# Patient Record
Sex: MALE | Race: WHITE | NOT HISPANIC OR LATINO | Employment: FULL TIME | URBAN - METROPOLITAN AREA
[De-identification: names, ages, dates, MRNs, and addresses within clinical notes are randomized per-mention and may not be internally consistent; named-entity substitution may affect disease eponyms.]

---

## 2019-03-29 ENCOUNTER — VIRTUAL VISIT (OUTPATIENT)
Dept: FAMILY MEDICINE | Facility: OTHER | Age: 59
End: 2019-03-29

## 2019-03-30 NOTE — PROGRESS NOTES
"Date:   Clinician: Morales Rios  Clinician NPI: [id number]  Patient: Paul Valverde  Patient : 1960  Patient Address: [facility] [place] [address]  Patient Phone: [phone]  Visit Protocol: URI  Patient Summary:  Paul is a 58 year old ( : 1960 ) male who initiated a Visit for cold, sinus infection, or influenza. When asked the question \"Please sign me up to receive news, health information and promotions from [employer].\", Paul responded \"No\".    Paul states his symptoms started gradually 10-13 days ago. After his symptoms started, they improved and then got worse again.   His symptoms consist of malaise, nasal congestion, myalgia, and a sore throat.   Symptom details     Nasal secretions: The color of his mucus is green.    Sore throat: Paul reports having moderate throat pain (4-6 on a 10 point pain scale), does not have exudate on his tonsils, and can swallow liquids. The lymph nodes in his neck are not enlarged. A rash has not appeared on the skin since the sore throat started.      Paul denies having fever, facial pain or pressure, cough, chills, rhinitis, wheezing, teeth pain, enlarged lymph nodes, headache, and ear pain. He also denies taking antibiotic medication for the symptoms and having recent facial or sinus surgery in the past 60 days. He is not experiencing dyspnea.   Within the past week, Paul has not been exposed to someone with strep throat. He has not recently been exposed to someone with influenza. Paul has not been in close contact with any high risk individuals.   Paul had 2 sinus infections within the past year.   Weight: 215 lbs   Paul does not smoke or use smokeless tobacco.   MEDICATIONS: simvastatin oral, ALLERGIES: NKDA  Clinician Response:  Dear Paul,  Based on the information provided, you have a viral upper respiratory infection, otherwise known as a cold. Symptoms vary from person to person, but can include sneezing, coughing, a runny " nose, sore throat, and headache and range from mild to severe.  Unfortunately, there are no medications that can cure a cold, so treatment is focused on controlling symptoms as much as possible. Most people gradually feel better until symptoms are gone in 1-2 weeks.  Medication information  Because you have a viral infection, antibiotics will not help you get better. Treating a viral infection with antibiotics could actually make you feel worse.  For more information on why I am not prescribing antibiotics, please watch this video: Antibiotics Aren't Always the Answer.  Unless you are allergic to the over-the-counter medication(s) below, I recommend using:       Acetaminophen (Tylenol or store brand) oral tablet. Take 1-2 tablets by mouth every 4-6 hours to help with the discomfort.    A decongestant such as Sudafed PE or store brand.      Guaifenesin + dextromethorphan (Robitussin DM, Mucinex DM, or store brand).      Saline nasal spray ([place] or store brand). Use 1-2 sprays in each nostril 3 times a day as needed for congestion.     Over-the-counter medications do not require a prescription. Ask the pharmacist if you have any questions.  Self care  The following tips will keep you as comfortable as possible while you recover:     Rest    Drink plenty of water and other liquids    Take a hot shower to loosen congestion    Use throat lozenges    Gargle with warm salt water (1/4 teaspoon of salt per 8 ounce glass of water)    Suck on frozen items such as popsicles or ice cubes    Drink hot tea with lemon and honey     When to seek care  Please be seen in a clinic or urgent care if new symptoms develop, or symptoms become worse.  Call 911 or go to the emergency room if you feel that your throat is closing off, you suddenly develop a rash, you are unable to swallow fluids, you are drooling, or you are having difficulty breathing.   Diagnosis: Viral URI  Diagnosis ICD: J06.9  Additional Clinician Notes: appears you have  another common cold virus. Therefore antibiotics are not helpful. There are no additional prescription medicine I would recommend at this time. Likely resolve gradually over the next 5-7 days. If worsening please seek care in a clinic or urgent care setting for further recommendations.

## 2023-02-08 ENCOUNTER — OFFICE VISIT (OUTPATIENT)
Dept: FAMILY MEDICINE | Facility: CLINIC | Age: 63
End: 2023-02-08

## 2023-02-08 VITALS
TEMPERATURE: 98 F | HEIGHT: 69 IN | DIASTOLIC BLOOD PRESSURE: 96 MMHG | HEART RATE: 67 BPM | BODY MASS INDEX: 32.14 KG/M2 | RESPIRATION RATE: 18 BRPM | SYSTOLIC BLOOD PRESSURE: 148 MMHG | OXYGEN SATURATION: 97 % | WEIGHT: 217 LBS

## 2023-02-08 DIAGNOSIS — Z71.89 ACP (ADVANCE CARE PLANNING): ICD-10-CM

## 2023-02-08 DIAGNOSIS — I48.20 CHRONIC ATRIAL FIBRILLATION (H): ICD-10-CM

## 2023-02-08 DIAGNOSIS — J32.9 RECURRENT SINUS INFECTIONS: Primary | ICD-10-CM

## 2023-02-08 DIAGNOSIS — E78.2 MIXED HYPERLIPIDEMIA: ICD-10-CM

## 2023-02-08 DIAGNOSIS — Z76.89 HEALTH CARE HOME: ICD-10-CM

## 2023-02-08 PROCEDURE — 99202 OFFICE O/P NEW SF 15 MIN: CPT | Performed by: FAMILY MEDICINE

## 2023-02-08 RX ORDER — AZITHROMYCIN 250 MG/1
TABLET, FILM COATED ORAL
COMMUNITY
Start: 2023-02-02 | End: 2023-02-08

## 2023-02-08 RX ORDER — ATORVASTATIN CALCIUM 20 MG/1
20 TABLET, FILM COATED ORAL DAILY
COMMUNITY
Start: 2022-06-24 | End: 2023-03-29

## 2023-02-08 RX ORDER — FLUTICASONE PROPIONATE 50 MCG
1 SPRAY, SUSPENSION (ML) NASAL DAILY
COMMUNITY

## 2023-02-08 RX ORDER — LORATADINE AND PSEUDOEPHEDRINE SULFATE 5; 120 MG/1; MG/1
1 TABLET, EXTENDED RELEASE ORAL
COMMUNITY
Start: 2023-02-02 | End: 2023-02-08

## 2023-02-08 RX ORDER — METHYLPREDNISOLONE 4 MG
TABLET, DOSE PACK ORAL
COMMUNITY
Start: 2023-02-02 | End: 2023-02-08

## 2023-02-08 NOTE — PROGRESS NOTES
"SUBJECTIVE:  Paul Valverde, a 62 year old male scheduled an appointment to discuss the following issues:     Recurrent sinus infections  Mixed hyperlipidemia  ACP (advance care planning)  Health Care Home  Chronic atrial fibrillation (H)  Pt here to discuss chronic sinus issues.He gets 2-3 infections yearly    He was seen in  2/2/23 and started on Zpack for sinusitis-is feeling better but still runny nose-pt coughs more when laying down at night    Pt has seen ENT in past- told to use flonase throughout winter-states he had CT    Pt does occasional sinus rinses    He has never seen allergy  Medical, social, surgical, and family histories reviewed.    ROS:  CONSTITUTIONAL: NEGATIVE for fever, chills  EYES: NEGATIVE for vision changes   RESP: NEGATIVE for significant cough or SOB  CV: NEGATIVE for chest pain, palpitations   GI: NEGATIVE for nausea, abdominal pain, heartburn, or change in bowel habits  : NEGATIVE for frequency, dysuria, or hematuria  MUSCULOSKELETAL: NEGATIVE for significant arthralgias or myalgia  NEURO: NEGATIVE for weakness, dizziness or paresthesias or headache    OBJECTIVE:  BP (!) 148/96 (BP Location: Right arm, Patient Position: Sitting, Cuff Size: Adult Large)   Pulse 67   Temp 98  F (36.7  C) (Temporal)   Resp 18   Ht 1.753 m (5' 9\")   Wt 98.4 kg (217 lb)   SpO2 97%   BMI 32.05 kg/m    EXAM:  GENERAL APPEARANCE: healthy, alert and no distress  EYES: EOMI,  PERRL  HENT: ear canals and TM's normal and nose and mouth without ulcers or lesions  NECK: no adenopathy, no asymmetry, masses, or scars and thyroid normal to palpation  RESP: lungs clear to auscultation - no rales, rhonchi or wheezes    ASSESSMENT/PLAN:  (J32.9) Recurrent sinus infections  (primary encounter diagnosis)  Comment: pt has seen ENT, tried flonase and sinus rinses-did have CT but no scope at ENT, discussed allergy referral  Plan: refer to allergy for ongoing issues    (E78.2) Mixed hyperlipidemia  Comment: control " uncertain  Plan: continue current medications at current doses     (Z71.89) ACP (advance care planning)  Comment:   Plan:     (Z76.89) Health Care Home  Comment:   Plan:     (I48.20) Chronic atrial fibrillation (H)  Comment: sees cardiology, not on anticoagulant due to low CHADvasc score  Plan:

## 2023-02-08 NOTE — NURSING NOTE
Chief Complaint   Patient presents with     New Patient     New patient to this clinic      Consult For     Has been having sinus pressure and congestion, gets sinus infections all of the time, wants to review what he can do about this going forward to try and help with it since it is a chronic problem, he went to urgent care and did start a zpac last Thursday and is feeling alright but still having sinus problems      Pre-visit Screening:  Immunizations:  not up to date - due for tetanus-declined today   Colonoscopy:  is up to date-need to get records  Mammogram: MILTON  Asthma Action Test/Plan:  MILTON  PHQ9:  PHQ2 done today   GAD7:  NA  Questioned patient about current smoking habits Pt. has never smoked.  Ok to leave detailed message on voice mail for today's visit only Yes, phone # [phone]

## 2023-02-08 NOTE — LETTER
[facility]  [address]  [address]  [facility] [address]  [phone]      February 8, 2023      Paul Valverde  [address] [place] [id number]      EMERGENCY CARE PLAN  Presenting Problem Treatment Plan   Questions or concerns during clinic hours I will call the clinic directly:    [facility]  [address] , [address]  [place] [address]  [phone]   Questions or concerns outside clinic hours  I will call the 24 hour line at [phone]   Patient needs to schedule an appointment  I will call the  scheduling line at [phone]   Same day treatment   I will call the clinic first, then  urgent care and/or  express care if needed   Clinic Care Coordinators Breana Bueno RN:  [phone]  [facility] Clinical Support Staff: [phone]    Crisis Services:  Behavioral or Mental Health B[facility] ([facility]al [facility] Providers)   [phone]   Emergency treatment--Immediately CALL [phone]

## 2023-02-27 ENCOUNTER — OFFICE VISIT (OUTPATIENT)
Dept: FAMILY MEDICINE | Facility: CLINIC | Age: 63
End: 2023-02-27

## 2023-02-27 VITALS
SYSTOLIC BLOOD PRESSURE: 138 MMHG | TEMPERATURE: 97.9 F | BODY MASS INDEX: 31.31 KG/M2 | HEIGHT: 69 IN | WEIGHT: 211.4 LBS | RESPIRATION RATE: 20 BRPM | DIASTOLIC BLOOD PRESSURE: 92 MMHG | HEART RATE: 76 BPM

## 2023-02-27 DIAGNOSIS — I48.20 CHRONIC ATRIAL FIBRILLATION (H): ICD-10-CM

## 2023-02-27 DIAGNOSIS — Z11.59 SCREENING FOR VIRAL DISEASE: ICD-10-CM

## 2023-02-27 DIAGNOSIS — Z00.00 ROUTINE GENERAL MEDICAL EXAMINATION AT A HEALTH CARE FACILITY: Primary | ICD-10-CM

## 2023-02-27 DIAGNOSIS — R03.0 ELEVATED BLOOD PRESSURE READING WITHOUT DIAGNOSIS OF HYPERTENSION: ICD-10-CM

## 2023-02-27 DIAGNOSIS — Z23 NEED FOR TETANUS BOOSTER: ICD-10-CM

## 2023-02-27 DIAGNOSIS — E78.2 MIXED HYPERLIPIDEMIA: ICD-10-CM

## 2023-02-27 DIAGNOSIS — Z12.5 SPECIAL SCREENING FOR MALIGNANT NEOPLASM OF PROSTATE: ICD-10-CM

## 2023-02-27 PROBLEM — I48.19 PERSISTENT ATRIAL FIBRILLATION (H): Status: ACTIVE | Noted: 2019-10-03

## 2023-02-27 PROBLEM — I10 HYPERTENSION: Status: ACTIVE | Noted: 2019-10-03

## 2023-02-27 LAB
ALBUMIN SERPL-MCNC: 4.3 G/DL (ref 3.6–5.1)
ALBUMIN/GLOB SERPL: 1.5 {RATIO} (ref 1–2.5)
ALP SERPL-CCNC: 67 U/L (ref 33–130)
ALT 1742-6: 18 U/L (ref 0–32)
AST 1920-8: 15 U/L (ref 0–35)
BILIRUB SERPL-MCNC: 1.2 MG/DL (ref 0.2–1.2)
BUN SERPL-MCNC: 13 MG/DL (ref 7–25)
BUN/CREATININE RATIO: 13.1 (ref 6–22)
CALCIUM SERPL-MCNC: 9.6 MG/DL (ref 8.6–10.3)
CHLORIDE SERPLBLD-SCNC: 105.8 MMOL/L (ref 98–110)
CHOLEST SERPL-MCNC: 172 MG/DL (ref 0–199)
CHOLEST/HDLC SERPL: 4 {RATIO} (ref 0–5)
CO2 SERPL-SCNC: 29.1 MMOL/L (ref 20–32)
CREAT SERPL-MCNC: 0.99 MG/DL (ref 0.6–1.3)
GLOBULIN, CALCULATED - QUEST: 2.9 (ref 1.9–3.7)
GLUCOSE SERPL-MCNC: 91 MG/DL (ref 60–99)
HDLC SERPL-MCNC: 47 MG/DL (ref 40–150)
LDLC SERPL CALC-MCNC: 103 MG/DL (ref 0–130)
POTASSIUM SERPL-SCNC: 4.78 MMOL/L (ref 3.5–5.3)
PROT SERPL-MCNC: 7.2 G/DL (ref 6.1–8.1)
SODIUM SERPL-SCNC: 142.2 MMOL/L (ref 135–146)
TRIGL SERPL-MCNC: 108 MG/DL (ref 0–149)

## 2023-02-27 PROCEDURE — 99396 PREV VISIT EST AGE 40-64: CPT | Mod: 25 | Performed by: FAMILY MEDICINE

## 2023-02-27 PROCEDURE — 90471 IMMUNIZATION ADMIN: CPT | Performed by: FAMILY MEDICINE

## 2023-02-27 PROCEDURE — 80053 COMPREHEN METABOLIC PANEL: CPT | Performed by: FAMILY MEDICINE

## 2023-02-27 PROCEDURE — 36415 COLL VENOUS BLD VENIPUNCTURE: CPT | Performed by: FAMILY MEDICINE

## 2023-02-27 PROCEDURE — 84153 ASSAY OF PSA TOTAL: CPT | Mod: 90 | Performed by: FAMILY MEDICINE

## 2023-02-27 PROCEDURE — 80061 LIPID PANEL: CPT | Performed by: FAMILY MEDICINE

## 2023-02-27 PROCEDURE — 90715 TDAP VACCINE 7 YRS/> IM: CPT | Performed by: FAMILY MEDICINE

## 2023-02-27 NOTE — NURSING NOTE
Paul Valverde is here for a CPX.    Pre-visit planning  Immunizations -up to date  Colonoscopy -is up to date  Mammogram -  Asthma test --  PHQ9 -  JOHN 7 -      Questioned patient about current smoking habits.  Pt. has never smoked.  Body mass index is 31.22 kg/m .  PULSE regular  My Chart:   CLASSIFICATION OF OVERWEIGHT AND OBESITY BY BMI                        Obesity Class           BMI(kg/m2)  Underweight                                    < 18.5  Normal                                         18.5-24.9  Overweight                                     25.0-29.9  OBESITY                     I                  30.0-34.9                             II                 35.0-39.9  EXTREME OBESITY             III                >40                            Patient's  BMI Body mass index is 31.22 kg/m .

## 2023-02-27 NOTE — LETTER
February 28, 2023      Paul Valverde  [address] [place] [address]        Dear ,    We are writing to inform you of your test results.    Your test results fall within the expected range(s) or remain unchanged from previous results.  Please continue with current treatment plan. Blood sugar, liver, kidney, lipids, and PSA were in normal range-looks great!    Resulted Orders   Lipid Panel ([facility])   Result Value Ref Range    Cholesterol 172 0 - 199 mg/dL    Triglycerides 108 0 - 149 mg/dL    HDL Cholesterol 47 40 - 150 mg/dL    LDL Cholesterol Direct 103 0 - 130 mg/dL    Cholesterol/HDL Ratio 4 0 - 5   Comprehensive Metobolic Panel (BFP)   Result Value Ref Range    Carbon Dioxide 29.1 20 - 32 mmol/L    Creatinine 0.99 0.60 - 1.30 mg/dL    Glucose 91 60 - 99 mg/dL    Sodium 142.2 135 - 146 mmol/L    Potassium 4.78 3.5 - 5.3 mmol/L    Chloride 105.8 98 - 110 mmol/L    Protein Total 7.2 6.1 - 8.1 g/dL    Albumin 4.3 3.6 - 5.1 g/dL    Alkaline Phosphatase 67 33 - 130 U/L    ALT 18 0 - 32 U/L    AST 15 0 - 35 U/L    Bilirubin Total 1.2 0.2 - 1.2 mg/dL    Urea Nitrogen 13 7 - 25 mg/dL    Calcium 9.6 8.6 - 10.3 mg/dL    BUN/Creatinine Ratio 13.1 6 - 22    Globulin Calculated 2.9 1.9 - 3.7    A/G Ratio 1.5 1 - 2.5   PSA Total ([facility])   Result Value Ref Range    Abbott PSA 2.75 < OR = 4.00 ng/mL      Comment:      The total PSA value from this assay system is   standardized against the WHO standard. The test   result will be approximately 20% lower when compared   to the equimolar-standardized total PSA (Sandra   [place]). Comparison of serial PSA results should be   interpreted with this fact in mind.     This test was performed using the Siemens   chemiluminescent method. Values obtained from   different assay methods cannot be used  interchangeably. PSA levels, regardless of  value, should not be interpreted as absolute  evidence of the presence or absence of disease.         If you have any questions or  concerns, please call the clinic at the number listed above.       Sincerely,      Toni Mendoza MD

## 2023-02-27 NOTE — PROGRESS NOTES
3  SUBJECTIVE:   CC: Paul Valverde is an 62 year old male who presents for preventive health visit.       Patient has been advised of split billing requirements and indicates understanding: Yes  Healthy Habits:    Do you get at least three servings of calcium containing foods daily (dairy, green leafy vegetables, etc.)? yes    Amount of exercise or daily activities, outside of work: 3 day(s) per week    Problems taking medications regularly No    Medication side effects: No    Have you had an eye exam in the past two years? yes    Do you see a dentist twice per year? yes    Do you have sleep apnea, excessive snoring or daytime drowsiness?no          Today's PHQ-2 Score:   PHQ-2 ( 1999 Pfizer) 2/8/2023   Q1: Little interest or pleasure in doing things 0   Q2: Feeling down, depressed or hopeless 0   PHQ-2 Score 0       Abuse: Current or Past(Physical, Sexual or Emotional)- No  Do you feel safe in your environment? yes        Social History     Tobacco Use     Smoking status: Never     Smokeless tobacco: Never   Substance Use Topics     Alcohol use: Not Currently     If you drink alcohol do you typically have >3 drinks per day or >7 drinks per week? No                      Last PSA: No results found for: PSA    Reviewed orders with patient. Reviewed health maintenance and updated orders accordingly - Yes  BP Readings from Last 3 Encounters:   02/27/23 (!) 138/92   02/08/23 (!) 148/96    Wt Readings from Last 3 Encounters:   02/27/23 95.9 kg (211 lb 6.4 oz)   02/08/23 98.4 kg (217 lb)                  Patient Active Problem List   Diagnosis     ACP (advance care planning)     Health Care Home     Hypertension     Persistent atrial fibrillation (H)     Hyperlipidemia     Past Surgical History:   Procedure Laterality Date     KNEE SURGERY Left        Social History     Tobacco Use     Smoking status: Never     Smokeless tobacco: Never   Substance Use Topics     Alcohol use: Not Currently     Family History   Problem  "Relation Age of Onset     Diabetes No family hx of      Cerebrovascular Disease No family hx of      Colon Cancer No family hx of      Coronary Artery Disease No family hx of          Current Outpatient Medications   Medication Sig Dispense Refill     atorvastatin (LIPITOR) 20 MG tablet Take 20 mg by mouth daily       fluticasone (FLONASE) 50 MCG/ACT nasal spray Spray 1 spray into both nostrils daily       No Known Allergies  No lab results found.     Reviewed and updated as needed this visit by clinical staff     Meds              Reviewed and updated as needed this visit by Provider                 No past medical history on file.   Past Surgical History:   Procedure Laterality Date     KNEE SURGERY Left        ROS:  CONSTITUTIONAL: NEGATIVE for fever, chills, change in weight  INTEGUMENTARY/SKIN: NEGATIVE for worrisome rashes, moles or lesions  EYES: NEGATIVE for vision changes or irritation  ENT: NEGATIVE for ear, mouth and throat problems  RESP: NEGATIVE for significant cough or SOB  CV: NEGATIVE for chest pain, palpitations or peripheral edema  GI: NEGATIVE for nausea, abdominal pain, heartburn, or change in bowel habits   male: negative for dysuria, hematuria, decreased urinary stream, erectile dysfunction, urethral discharge  MUSCULOSKELETAL: NEGATIVE for significant arthralgias or myalgia  NEURO: NEGATIVE for weakness, dizziness or paresthesias  ENDOCRINE: NEGATIVE for temperature intolerance, skin/hair changes  HEME/ALLERGY/IMMUNE: NEGATIVE for bleeding problems  PSYCHIATRIC: NEGATIVE for changes in mood or affect    OBJECTIVE:   BP (!) 138/92 (BP Location: Right arm, Patient Position: Chair, Cuff Size: Adult Large)   Pulse 76   Temp 97.9  F (36.6  C) (Temporal)   Resp 20   Ht 1.753 m (5' 9\")   Wt 95.9 kg (211 lb 6.4 oz)   BMI 31.22 kg/m    EXAM:  GENERAL: healthy, alert and no distress  EYES: Eyes grossly normal to inspection, PERRL and conjunctivae and sclerae normal  HENT: ear canals and TM's " normal, nose and mouth without ulcers or lesions  NECK: no adenopathy, no asymmetry, masses, or scars and thyroid normal to palpation  RESP: lungs clear to auscultation - no rales, rhonchi or wheezes  CV: regular rate and rhythm, normal S1 S2, no S3 or S4, no murmur, click or rub, no peripheral edema and peripheral pulses strong  ABDOMEN: soft, nontender, no hepatosplenomegaly, no masses and bowel sounds normal   (male): normal male genitalia without lesions or urethral discharge, no hernia  MS: no gross musculoskeletal defects noted, no edema  SKIN: no suspicious lesions or rashes  NEURO: Normal strength and tone, mentation intact and speech normal  PSYCH: mentation appears normal, affect normal/bright    Diagnostic Test Results:  Labs reviewed in Epic    ASSESSMENT/PLAN:   (Z00.00) Routine general medical examination at a health care facility  (primary encounter diagnosis)  Comment: discussed preventitive healthcare   Plan: Continue to work on healthy diet and exercise, discussed healthy habits     (E78.2) Mixed hyperlipidemia  Comment: control uncertain  Plan: Lipid Panel ([facility]FP), Comprehensive Metobolic         Panel ([facility]), VENOUS COLLECTION        continue current medications at current doses pending labs    (I48.20) Chronic atrial fibrillation (H)  Comment: stable, lw CHADvasc so not on anticoagulation  Plan:     (Z12.5) Special screening for malignant neoplasm of prostate  Comment:   Plan: PSA Total ([facility])                (Z23) Need for tetanus booster  Comment:   Plan:     (R03.0) Elevated blood pressure reading without diagnosis of hypertension  Comment:   Plan: Check blood pressure readings outside of the clinic several times per week, write down values, and follow up if elevated within the next several weeks. Blood pressure can be checked at the firestation, drugstore,  or any valid site.     Patient has been advised of split billing requirements and indicates understanding: Yes  COUNSELING:  Reviewed  "preventive health counseling, as reflected in patient instructions       Regular exercise       Healthy diet/nutrition       Vision screening       Immunizations    Vaccinated for: TDAP             Colorectal cancer screening       Prostate cancer screening    Estimated body mass index is 31.22 kg/m  as calculated from the following:    Height as of this encounter: 1.753 m (5' 9\").    Weight as of this encounter: 95.9 kg (211 lb 6.4 oz).    Weight management plan: Discussed healthy diet and exercise guidelines    He reports that he has never smoked. He has never used smokeless tobacco.      Counseling Resources:  ATP IV Guidelines  Pooled Cohorts Equation Calculator  FRAX Risk Assessment  ICSI Preventive Guidelines  Dietary Guidelines for Americans, 2010  USDA's MyPlate  ASA Prophylaxis  Lung CA Screening    Toni Mendoza MD  [facility]  "

## 2023-02-28 LAB — ABBOTT PSA - QUEST: 2.75 NG/ML

## 2023-03-20 ENCOUNTER — TELEPHONE (OUTPATIENT)
Dept: FAMILY MEDICINE | Facility: CLINIC | Age: 63
End: 2023-03-20

## 2023-03-20 NOTE — TELEPHONE ENCOUNTER
Pt called stating he has not received a call from an allergist for his re-occurring sinus infections.    Please advise # [phone]    Thanks, Bernie LECHUGA

## 2023-03-20 NOTE — TELEPHONE ENCOUNTER
[facility]   [address] [address]   Phone: [phone]   Fax: [phone]     I left a message for patient to call me back. I will give him the above information to call & schedule his appointment.

## 2023-03-21 NOTE — TELEPHONE ENCOUNTER
I talked with patient this morning. Patient was given info below to call & schedule his appointment. Patient stated that as of 3.1.2023 he now has [employer] insurance. Patient was advised to call [facility] to ask about in network providers and benefits.

## 2023-03-29 DIAGNOSIS — E78.2 MIXED HYPERLIPIDEMIA: Primary | ICD-10-CM

## 2023-03-29 RX ORDER — ATORVASTATIN CALCIUM 20 MG/1
20 TABLET, FILM COATED ORAL DAILY
Qty: 90 TABLET | Refills: 3 | Status: SHIPPED | OUTPATIENT
Start: 2023-03-29 | End: 2024-04-04

## 2023-03-29 NOTE — TELEPHONE ENCOUNTER
Paul Valverde is requesting a refill of:    Pending Prescriptions:                       Disp   Refills    atorvastatin (LIPITOR) 20 MG tablet       90 tab*3            Sig: Take 1 tablet (20 mg) by mouth daily    Please close encounter if RX was sent. Thanks, Jazzy    Recent Labs   Lab Test 02/27/23  0000   CHOL 172   HDL 47      TRIG 108   CHOLHDLRATIO 4

## 2023-04-26 ENCOUNTER — OFFICE VISIT (OUTPATIENT)
Dept: ALLERGY | Facility: CLINIC | Age: 63
End: 2023-04-26
Attending: FAMILY MEDICINE
Payer: COMMERCIAL

## 2023-04-26 VITALS
DIASTOLIC BLOOD PRESSURE: 102 MMHG | WEIGHT: 223.1 LBS | SYSTOLIC BLOOD PRESSURE: 161 MMHG | BODY MASS INDEX: 32.95 KG/M2 | OXYGEN SATURATION: 98 % | HEART RATE: 62 BPM

## 2023-04-26 DIAGNOSIS — R09.81 NASAL CONGESTION: Primary | ICD-10-CM

## 2023-04-26 DIAGNOSIS — J32.9 RECURRENT SINUS INFECTIONS: ICD-10-CM

## 2023-04-26 PROCEDURE — 95004 PERQ TESTS W/ALRGNC XTRCS: CPT | Performed by: INTERNAL MEDICINE

## 2023-04-26 PROCEDURE — 99203 OFFICE O/P NEW LOW 30 MIN: CPT | Mod: 25 | Performed by: INTERNAL MEDICINE

## 2023-04-26 ASSESSMENT — ENCOUNTER SYMPTOMS
SHORTNESS OF BREATH: 0
HEMATURIA: 0
ABDOMINAL PAIN: 0
BACK PAIN: 0
VOMITING: 0
SORE THROAT: 0
RHINORRHEA: 1
PALPITATIONS: 0
CHILLS: 0
COUGH: 0
COLOR CHANGE: 0
FEVER: 0
ARTHRALGIAS: 0
EYE PAIN: 0
DYSURIA: 0
SEIZURES: 0

## 2023-04-26 NOTE — PROGRESS NOTES
Per provider verbal order, placed Positive/Negative Control, Dust Mite P, Dust Mite F, Aspergillus, Alternaria, and H. Cladosporium scratch test. Once panels were placed, patient was monitored for 15 minutes in clinic.  Provider read test after 15 minutes.  Pt tolerated procedure well.  All questions and concerns were addressed at office visit.     DYLON MaradiagaN, RN

## 2023-04-26 NOTE — PROGRESS NOTES
Paul Valverde was seen in the Allergy Clinic at [facility].    Paul Valverde is a 62 year old male being seen today at the request of Toni Mendoza MD in consultation for recurrent sinus infections.    He estimates he has had 2-3 sinus infections per year.  He responds well to azithromycin.  He has seen an ENT and did have a sinus CT scan that was reportedly normal.  At that point he was recommended to see an allergist.  He does use Flonase daily.  At one point he did have oral prednisone to use as needed whenever he had felt like the sinus infection started, but he did not find that particularly effective.  He is found the azithromycin to be helpful which was last used February 2023.    Symptoms are typically worse in the fall in the winter.  He does not have any other recurrent infections such as pneumonia or bronchitis, GI infections, skin infections.  It is primarily the sinus symptoms which consist of significant nasal congestion without significant mucus production and significant fatigue, and no fevers or chills.      No past medical history on file.  Family History   Problem Relation Age of Onset     Diabetes No family hx of      Cerebrovascular Disease No family hx of      Colon Cancer No family hx of      Coronary Artery Disease No family hx of      Past Surgical History:   Procedure Laterality Date     KNEE SURGERY Left        ENVIRONMENTAL HISTORY:   Pets inside the house include 1 dog(s).  Do you smoke cigarettes or other recreational drugs? No There is/are 0 smokers living in the house. The house does not have a damp basement.     SOCIAL HISTORY:   Paul is employed as . He lives with his spouse.      Review of Systems   Constitutional: Negative for chills and fever.   HENT: Positive for rhinorrhea. Negative for ear pain and sore throat.    Eyes: Negative for pain and visual disturbance.   Respiratory: Negative for cough and shortness of breath.     Cardiovascular: Negative for chest pain and palpitations.   Gastrointestinal: Negative for abdominal pain and vomiting.   Genitourinary: Negative for dysuria and hematuria.   Musculoskeletal: Negative for arthralgias and back pain.   Skin: Negative for color change and rash.   Neurological: Negative for seizures and syncope.   All other systems reviewed and are negative.        Current Outpatient Medications:      atorvastatin (LIPITOR) 20 MG tablet, Take 1 tablet (20 mg) by mouth daily, Disp: 90 tablet, Rfl: 3     fluticasone (FLONASE) 50 MCG/ACT nasal spray, Spray 1 spray into both nostrils daily, Disp: , Rfl:   No Known Allergies      EXAM:   BP (!) 161/102   Pulse 62   Wt 101.2 kg (223 lb 1.6 oz)   SpO2 98%   BMI 32.95 kg/m      Physical Exam    Constitutional:       General: He is not in acute distress.     Appearance: Normal appearance. He is not ill-appearing.   HENT:      Head: Normocephalic and atraumatic.      Nose: Mild turbinate hypertrophy bilaterally     Mouth/Throat:      Mouth: Mucous membranes are moist.      Pharynx: Oropharynx is clear. No posterior oropharyngeal erythema.   Eyes:      General:         Right eye: No discharge.         Left eye: No discharge.   Cardiovascular:      Rate and Rhythm: Normal rate and regular rhythm.      Heart sounds: Normal heart sounds.   Pulmonary:      Effort: Pulmonary effort is normal.      Breath sounds: Normal breath sounds. No wheezing or rhonchi.   Skin:     General: Skin is warm.      Findings: No erythema or rash.   Neurological:      General: No focal deficit present.      Mental Status: He is alert. Mental status is at baseline.   Psychiatric:         Mood and Affect: Mood normal.         Behavior: Behavior normal.     WORKUP: Skin testing was performed to dust mite and mold only.  These were negative on skin testing    ENVIRONMENTAL PERCUTANEOUS SKIN TESTING: ADULT      4/26/2023     1:00 PM   [facility]   Consent Y   Ordering Physician  Dr. Hong   Interpreting Physician Dr. Hong   Testing Technician Sia GUEVARA RN   Location Back   Time start:  1:26 PM   Time End:  1:41 PM   Positive Control: Histatrol*ALK 1 mg/ml 3/5   Negative Control: 50% Glycerin 0   Dust Mite p. 30,000 AU/ml 0   Dust Mite f. (30,000 AU/ml) 0   Aspergillus fumigatus (1:10 w/v): 0   Alternaria tenius (1:10 w/v) 0   H. Cladosporium (1:10 w/v) 0        ASSESSMENT/PLAN:  Paul Valverde is a 62 year old male seen today for recurrent sinus infections and for evaluation for an allergic component.  To control cost we did a limited panel to the relevant allergens.  These were negative.  We will also order immunoglobulins due to the recurrent sinus infections.    1. Recommend continued use of the Flonase 1 to 2 sprays each nostril daily.  2. Nasal saline irrigation or Cheri pot as needed when you have increased congestion.  3. The use of Afrin 2 sprays each nostril twice daily when congested can be used for 3 days which may also help minimize your symptoms.  4. We will check immunoglobulins for evaluation of your immune system.    Follow-up as needed      Thank you for allowing me to participate in the care of Paul Valverde.      I spent 35 minutes on the date of the encounter doing chart review, history and exam, documentation and further coordination as noted above exclusive of separately reported interpretations    Geovanny Hong MD  Allergy/Immunology  [facility]

## 2023-04-26 NOTE — LETTER
4/26/2023         RE: Paul Valverde  [address] [place] [address]        Dear Colleague,    Thank you for referring your patient, Paul Valverde, to the [facility] [place]. Please see a copy of my visit note below.    Paul Valverde was seen in the Allergy Clinic at [facility].    Paul Valverde is a 62 year old male being seen today at the request of Toni Mendoza MD in consultation for recurrent sinus infections.    He estimates he has had 2-3 sinus infections per year.  He responds well to azithromycin.  He has seen an ENT and did have a sinus CT scan that was reportedly normal.  At that point he was recommended to see an allergist.  He does use Flonase daily.  At one point he did have oral prednisone to use as needed whenever he had felt like the sinus infection started, but he did not find that particularly effective.  He is found the azithromycin to be helpful which was last used February 2023.    Symptoms are typically worse in the fall in the winter.  He does not have any other recurrent infections such as pneumonia or bronchitis, GI infections, skin infections.  It is primarily the sinus symptoms which consist of significant nasal congestion without significant mucus production and significant fatigue, and no fevers or chills.      No past medical history on file.  Family History   Problem Relation Age of Onset     Diabetes No family hx of      Cerebrovascular Disease No family hx of      Colon Cancer No family hx of      Coronary Artery Disease No family hx of      Past Surgical History:   Procedure Laterality Date     KNEE SURGERY Left        ENVIRONMENTAL HISTORY:   Pets inside the house include 1 dog(s).  Do you smoke cigarettes or other recreational drugs? No There is/are 0 smokers living in the house. The house does not have a damp basement.     SOCIAL HISTORY:   Paul is employed as . He lives with his spouse.      Review of Systems    Constitutional: Negative for chills and fever.   HENT: Positive for rhinorrhea. Negative for ear pain and sore throat.    Eyes: Negative for pain and visual disturbance.   Respiratory: Negative for cough and shortness of breath.    Cardiovascular: Negative for chest pain and palpitations.   Gastrointestinal: Negative for abdominal pain and vomiting.   Genitourinary: Negative for dysuria and hematuria.   Musculoskeletal: Negative for arthralgias and back pain.   Skin: Negative for color change and rash.   Neurological: Negative for seizures and syncope.   All other systems reviewed and are negative.        Current Outpatient Medications:      atorvastatin (LIPITOR) 20 MG tablet, Take 1 tablet (20 mg) by mouth daily, Disp: 90 tablet, Rfl: 3     fluticasone (FLONASE) 50 MCG/ACT nasal spray, Spray 1 spray into both nostrils daily, Disp: , Rfl:   No Known Allergies      EXAM:   BP (!) 161/102   Pulse 62   Wt 101.2 kg (223 lb 1.6 oz)   SpO2 98%   BMI 32.95 kg/m      Physical Exam    Constitutional:       General: He is not in acute distress.     Appearance: Normal appearance. He is not ill-appearing.   HENT:      Head: Normocephalic and atraumatic.      Nose: Mild turbinate hypertrophy bilaterally     Mouth/Throat:      Mouth: Mucous membranes are moist.      Pharynx: Oropharynx is clear. No posterior oropharyngeal erythema.   Eyes:      General:         Right eye: No discharge.         Left eye: No discharge.   Cardiovascular:      Rate and Rhythm: Normal rate and regular rhythm.      Heart sounds: Normal heart sounds.   Pulmonary:      Effort: Pulmonary effort is normal.      Breath sounds: Normal breath sounds. No wheezing or rhonchi.   Skin:     General: Skin is warm.      Findings: No erythema or rash.   Neurological:      General: No focal deficit present.      Mental Status: He is alert. Mental status is at baseline.   Psychiatric:         Mood and Affect: Mood normal.         Behavior: Behavior normal.      WORKUP: Skin testing was performed to dust mite and mold only.  These were negative on skin testing    ENVIRONMENTAL PERCUTANEOUS SKIN TESTING: ADULT      4/26/2023     1:00 PM   [facility]   Consent Y   Ordering Physician Dr. Hong   Interpreting Physician Dr. Hnog   Testing Technician Sia GUEVARA RN   Location Back   Time start:  1:26 PM   Time End:  1:41 PM   Positive Control: Histatrol*ALK 1 mg/ml 3/5   Negative Control: 50% Glycerin 0   Dust Mite p. 30,000 AU/ml 0   Dust Mite f. (30,000 AU/ml) 0   Aspergillus fumigatus (1:10 w/v): 0   Alternaria tenius (1:10 w/v) 0   H. Cladosporium (1:10 w/v) 0        ASSESSMENT/PLAN:  Paul Valverde is a 62 year old male seen today for recurrent sinus infections and for evaluation for an allergic component.  To control cost we did a limited panel to the relevant allergens.  These were negative.  We will also order immunoglobulins due to the recurrent sinus infections.    1. Recommend continued use of the Flonase 1 to 2 sprays each nostril daily.  2. Nasal saline irrigation or Freeman pot as needed when you have increased congestion.  3. The use of Afrin 2 sprays each nostril twice daily when congested can be used for 3 days which may also help minimize your symptoms.  4. We will check immunoglobulins for evaluation of your immune system.    Follow-up as needed      Thank you for allowing me to participate in the care of Paul Valverde.      I spent 35 minutes on the date of the encounter doing chart review, history and exam, documentation and further coordination as noted above exclusive of separately reported interpretations    Geovanny Hong MD  Allergy/Immunology  [facility]      Per provider verbal order, placed Positive/Negative Control, Dust Mite P, Dust Mite F, Aspergillus, Alternaria, and H. Cladosporium scratch test. Once panels were placed, patient was monitored for 15 minutes in clinic.  Provider read test after 15 minutes.  Pt  tolerated procedure well.  All questions and concerns were addressed at office visit.     GLORY Maradiaga, RN              Again, thank you for allowing me to participate in the care of your patient.        Sincerely,        Geovanny Hong MD

## 2023-04-26 NOTE — PATIENT INSTRUCTIONS
Recommend continued use of the Flonase 1 to 2 sprays each nostril daily.  Nasal saline irrigation or Cheri pot as needed when you have increased congestion.  The use of Afrin 2 sprays each nostril twice daily when congested can be used for 3 days which may also help minimize your symptoms.  We will check immunoglobulins for evaluation of your immune system.    Allergy Staff Appt Hours Shot Hours Location       Physician   Geovanny Hong MD      Support Staff   LUIS Aquino, LUIS ROSS, FADI GAYLE LPN      Mondays Tuesdays Thursdays and Fridays:  Rosy 7-5 Wednesdays  Close                Mondays, Tuesdays and Fridays:  7:40 - 3:20              [facility]  [address]  [place] [address]  Appt Line: [phone]    Pulmonary Function Scheduling:  [place]: [phone]           Questions about cost of your care  For questions about your cost of your visit, procedure, lab or imaging contact:  [employer] [place] Consumer Price Line [phone] or visit:  [email or web address]

## 2023-05-08 ENCOUNTER — LAB (OUTPATIENT)
Dept: LAB | Facility: CLINIC | Age: 63
End: 2023-05-08
Payer: COMMERCIAL

## 2023-05-08 DIAGNOSIS — J32.9 RECURRENT SINUS INFECTIONS: ICD-10-CM

## 2023-05-08 PROCEDURE — 36415 COLL VENOUS BLD VENIPUNCTURE: CPT

## 2023-05-08 PROCEDURE — 82784 ASSAY IGA/IGD/IGG/IGM EACH: CPT

## 2023-05-09 LAB
IGA SERPL-MCNC: 277 MG/DL (ref 84–499)
IGG SERPL-MCNC: 902 MG/DL (ref 610–1616)
IGM SERPL-MCNC: 54 MG/DL (ref 35–242)

## 2023-11-15 ENCOUNTER — OFFICE VISIT (OUTPATIENT)
Dept: FAMILY MEDICINE | Facility: CLINIC | Age: 63
End: 2023-11-15

## 2023-11-15 VITALS
HEART RATE: 102 BPM | WEIGHT: 209.6 LBS | HEIGHT: 70 IN | SYSTOLIC BLOOD PRESSURE: 136 MMHG | OXYGEN SATURATION: 98 % | TEMPERATURE: 97.9 F | DIASTOLIC BLOOD PRESSURE: 94 MMHG | BODY MASS INDEX: 30.01 KG/M2 | RESPIRATION RATE: 20 BRPM

## 2023-11-15 DIAGNOSIS — J01.00 ACUTE MAXILLARY SINUSITIS, RECURRENCE NOT SPECIFIED: Primary | ICD-10-CM

## 2023-11-15 PROCEDURE — 99213 OFFICE O/P EST LOW 20 MIN: CPT | Performed by: FAMILY MEDICINE

## 2023-11-15 RX ORDER — AZITHROMYCIN 250 MG/1
TABLET, FILM COATED ORAL
Qty: 6 TABLET | Refills: 0 | Status: SHIPPED | OUTPATIENT
Start: 2023-11-15 | End: 2024-01-29

## 2023-11-15 NOTE — NURSING NOTE
Paul Valverde presents with an illness characterized by ear pain, ear plugging, nasal congestion, rhinorrhea, sinus pain and pressure. Symptoms began 11 days ago and are unchanged since that time.    Questioned patient about current smoking habits.  Pt. has never smoked.  Body mass index is 33.67 kg/(m^2).  PULSE regular  My Chart: active  Body mass index is 30.51 kg/m .  Pre-visit planning  Immunizations - up to date  Colonoscopy - is up to date  Mammogram -   Asthma -   PHQ9 -    JOHN-7 -      The patient has verbalized that it is ok to leave a detailed voice message on the patient's cell phone with results/recommendations from this visit.

## 2023-11-15 NOTE — PROGRESS NOTES
SUBJECTIVE:   Paul Valverde is a 62 year old male who complains of nasal congestion, green nasal discharge, headache, facial pressure, and fatigue for 11 days. He denies a history of productive cough, sweats, and chills and denies a history of asthma. Patient does not smoke cigarettes.    Pt takes flonase daily, netti pot, antihistamine    Pt has seen allergy and ENT in past, is prone to sinus infections    Patient Active Problem List   Diagnosis    ACP (advance care planning)    Health Care Home    Hypertension    Persistent atrial fibrillation (H)    Hyperlipidemia     No past medical history on file.    Family History   Problem Relation Age of Onset    Diabetes No family hx of     Cerebrovascular Disease No family hx of     Colon Cancer No family hx of     Coronary Artery Disease No family hx of        Social History     Socioeconomic History    Marital status:      Spouse name: Not on file    Number of children: Not on file    Years of education: Not on file    Highest education level: Not on file   Occupational History    Occupation: [email or web address]   Tobacco Use    Smoking status: Never     Passive exposure: Never    Smokeless tobacco: Never   Substance and Sexual Activity    Alcohol use: Not Currently    Drug use: Not on file    Sexual activity: Not on file   Other Topics Concern    Not on file   Social History Narrative    Not on file     Social Determinants of Health     Financial Resource Strain: Not on file   Food Insecurity: Not on file   Transportation Needs: Not on file   Physical Activity: Not on file   Stress: Not on file   Social Connections: Not on file   Interpersonal Safety: Not on file   Housing Stability: Not on file       Past Surgical History:   Procedure Laterality Date    KNEE SURGERY Left      atorvastatin (LIPITOR) 20 MG tablet, Take 1 tablet (20 mg) by mouth daily  fluticasone (FLONASE) 50 MCG/ACT nasal spray, Spray 1 spray into both nostrils daily    No current  "facility-administered medications on file prior to visit.       Allergies: Patient has no known allergies.      Immunization History   Administered Date(s) Administered    COVID-19 MONOVALENT 12+ (Pfizer) 04/05/2021, 04/26/2021, 12/22/2021    COVID-19 Monovalent 12+ (Pfizer 2022) 06/22/2022    Flu, Unspecified 09/28/2017    Hepatitis A (ADULT 19+) 01/26/2005, 09/08/2006    Influenza Vaccine, 6+MO IM (QUADRIVALENT W/PRESERVATIVES) 09/28/2017, 09/27/2018, 10/01/2019, 10/20/2020, 10/18/2021    Poliovirus, inactivated (IPV) 01/26/2005    TD,PF 7+ (Tenivac) 01/26/2005    TDAP (Adacel,Boostrix) 06/18/2012, 02/27/2023    Td (Adult), Adsorbed 01/26/2005    Typhoid IM 01/26/2005    Yellow Fever 09/08/2006    Zoster recombinant adjuvanted (SHINGRIX) 12/04/2020, 03/19/2021         OBJECTIVE:BP (!) 136/94 (BP Location: Right arm, Patient Position: Chair, Cuff Size: Adult Regular)   Pulse 102   Temp 97.9  F (36.6  C) (Temporal)   Resp 20   Ht 1.765 m (5' 9.5\")   Wt 95.1 kg (209 lb 9.6 oz)   SpO2 98%   BMI 30.51 kg/m     He appears well, vital signs are as noted by the nurse. Ears normal.  Throat and pharynx normal.  Neck supple. No adenopathy in the neck. Nose is congested. Sinuses  tender. The chest is clear, without wheezes or rales.    ASSESSMENT:   Sinusitis    PLAN:augmentin recommended but pt prefers Zpack, I reviewed the risks, benefits, and possible side effects of the medication.  The patient had an opportunity to ask any questions regarding the treatment plan. The patient was encouraged to call my office if any problems.   Symptomatic therapy suggested: push fluids, rest, and use decongestant of choice as needed. Call or return to clinic prn if these symptoms worsen or fail to improve as anticipated.   "

## 2024-01-29 ENCOUNTER — OFFICE VISIT (OUTPATIENT)
Dept: FAMILY MEDICINE | Facility: CLINIC | Age: 64
End: 2024-01-29

## 2024-01-29 VITALS
TEMPERATURE: 97.9 F | RESPIRATION RATE: 20 BRPM | HEART RATE: 80 BPM | DIASTOLIC BLOOD PRESSURE: 94 MMHG | SYSTOLIC BLOOD PRESSURE: 132 MMHG | WEIGHT: 216 LBS | BODY MASS INDEX: 30.92 KG/M2 | HEIGHT: 70 IN

## 2024-01-29 DIAGNOSIS — J01.00 ACUTE MAXILLARY SINUSITIS, RECURRENCE NOT SPECIFIED: Primary | ICD-10-CM

## 2024-01-29 PROCEDURE — 99213 OFFICE O/P EST LOW 20 MIN: CPT | Performed by: FAMILY MEDICINE

## 2024-01-29 NOTE — PROGRESS NOTES
"SUBJECTIVE:   Paul Valverde is a 63 year old male who complains of nasal congestion, facial pressure, and cough for 14 days. He denies a history of productive cough, sweats, chills, myalgias, shortness of breath, and teeth aching and denies a history of asthma. Patient does not smoke cigarettes.    Pt uses flonase daily, some saline irrigaton but no daily, has seen ENT and no structural problems, saw allergy and no allergies     OBJECTIVE:BP (!) 132/94 (BP Location: Right arm, Patient Position: Chair, Cuff Size: Adult Large)   Pulse 80   Temp 97.9  F (36.6  C) (Temporal)   Resp 20   Ht 1.765 m (5' 9.5\")   Wt 98 kg (216 lb)   BMI 31.44 kg/m     He appears well, vital signs are as noted by the nurse. Ears normal.  Throat and pharynx normal.  Neck supple. No adenopathy in the neck. Nose is congested. Sinuses tender maxillary. The chest is clear, without wheezes or rales.    ASSESSMENT:   Sinusitis-has had 2 this winter, will treat    PLAN:augmentin, recommend he also use sinus rinse daily  Symptomatic therapy suggested: push fluids, rest, and use decongestant of choice as needed. Call or return to clinic prn if these symptoms worsen or fail to improve as anticipated.   "

## 2024-01-29 NOTE — NURSING NOTE
Paul Valverde presents with an illness characterized by nasal congestion, rhinorrhea, cough, sinus and pressure. Symptoms began 2 weeks ago and are unchanged since that time.  Questioned patient about current smoking habits.  Pt. has never smoked.  Body mass index is 33.67 kg/(m^2).  PULSE regular  My Chart: active  Body mass index is 31.44 kg/m .  Pre-visit planning  Immunizations - discussed  Colonoscopy - UTD  Mammogram -   Asthma -   PHQ9 -    JOHN-7 -      The patient has verbalized that it is ok to leave a detailed voice message on the patient's cell phone with results/recommendations from this visit.

## 2024-04-04 ENCOUNTER — OFFICE VISIT (OUTPATIENT)
Dept: FAMILY MEDICINE | Facility: CLINIC | Age: 64
End: 2024-04-04

## 2024-04-04 VITALS
OXYGEN SATURATION: 98 % | WEIGHT: 210 LBS | SYSTOLIC BLOOD PRESSURE: 130 MMHG | BODY MASS INDEX: 30.06 KG/M2 | HEART RATE: 62 BPM | TEMPERATURE: 97.5 F | DIASTOLIC BLOOD PRESSURE: 86 MMHG | HEIGHT: 70 IN

## 2024-04-04 DIAGNOSIS — I48.20 CHRONIC ATRIAL FIBRILLATION (H): ICD-10-CM

## 2024-04-04 DIAGNOSIS — Z00.00 ROUTINE GENERAL MEDICAL EXAMINATION AT A HEALTH CARE FACILITY: ICD-10-CM

## 2024-04-04 DIAGNOSIS — E78.2 MIXED HYPERLIPIDEMIA: ICD-10-CM

## 2024-04-04 DIAGNOSIS — Z12.5 SPECIAL SCREENING FOR MALIGNANT NEOPLASM OF PROSTATE: ICD-10-CM

## 2024-04-04 DIAGNOSIS — Z12.11 SPECIAL SCREENING FOR MALIGNANT NEOPLASMS, COLON: ICD-10-CM

## 2024-04-04 LAB
ALBUMIN SERPL-MCNC: 4.2 G/DL (ref 3.6–5.1)
ALBUMIN/GLOB SERPL: 1.5 {RATIO} (ref 1–2.5)
ALP SERPL-CCNC: 64 U/L (ref 33–130)
ALT 1742-6: 18 U/L (ref 0–32)
AST 1920-8: 15 U/L (ref 0–35)
BILIRUB SERPL-MCNC: 1.7 MG/DL (ref 0.2–1.2)
BUN SERPL-MCNC: 13 MG/DL (ref 7–25)
BUN/CREATININE RATIO: 12.6 (ref 6–32)
CALCIUM SERPL-MCNC: 9.6 MG/DL (ref 8.6–10.3)
CHLORIDE SERPLBLD-SCNC: 102.4 MMOL/L (ref 98–110)
CHOLEST SERPL-MCNC: 160 MG/DL (ref 0–199)
CHOLEST/HDLC SERPL: 4 {RATIO} (ref 0–5)
CO2 SERPL-SCNC: 30.1 MMOL/L (ref 20–32)
CREAT SERPL-MCNC: 1.03 MG/DL (ref 0.6–1.3)
GLOBULIN, CALCULATED - QUEST: 2.8 (ref 1.9–3.7)
GLUCOSE SERPL-MCNC: 95 MG/DL (ref 60–99)
HDLC SERPL-MCNC: 44 MG/DL (ref 40–150)
LDLC SERPL CALC-MCNC: 95 MG/DL (ref 0–130)
POTASSIUM SERPL-SCNC: 4.74 MMOL/L (ref 3.5–5.3)
PROT SERPL-MCNC: 7 G/DL (ref 6.1–8.1)
SODIUM SERPL-SCNC: 139.9 MMOL/L (ref 135–146)
TRIGL SERPL-MCNC: 106 MG/DL (ref 0–149)

## 2024-04-04 PROCEDURE — 80053 COMPREHEN METABOLIC PANEL: CPT | Performed by: FAMILY MEDICINE

## 2024-04-04 PROCEDURE — 36415 COLL VENOUS BLD VENIPUNCTURE: CPT | Performed by: FAMILY MEDICINE

## 2024-04-04 PROCEDURE — 84153 ASSAY OF PSA TOTAL: CPT | Mod: 90 | Performed by: FAMILY MEDICINE

## 2024-04-04 PROCEDURE — 80061 LIPID PANEL: CPT | Performed by: FAMILY MEDICINE

## 2024-04-04 PROCEDURE — 99396 PREV VISIT EST AGE 40-64: CPT | Performed by: FAMILY MEDICINE

## 2024-04-04 RX ORDER — ATORVASTATIN CALCIUM 20 MG/1
20 TABLET, FILM COATED ORAL DAILY
Qty: 90 TABLET | Refills: 3 | Status: SHIPPED | OUTPATIENT
Start: 2024-04-04

## 2024-04-04 NOTE — NURSING NOTE
Chief Complaint   Patient presents with    Physical     Pt here for his CPX. Is fasting    Pre-visit Screening:  Immunizations:  not up to date - prevnar  Colonoscopy:  is due and ordered today  Mammogram: na  Asthma Action Test/Plan:  na  PHQ9:  PHQ2  GAD7:  na  Questioned patient about current smoking habits Pt. has never smoked.  Ok to leave detailed message on voice mail for today's visit only yes, phone # [phone]

## 2024-04-04 NOTE — PROGRESS NOTES
3  SUBJECTIVE:   CC: Paul Valverde is an 63 year old male who presents for preventive health visit.       Patient has been advised of split billing requirements and indicates understanding: Yes  Healthy Habits:  Do you get at least three servings of calcium containing foods daily (dairy, green leafy vegetables, etc.)? yes  Amount of exercise or daily activities, outside of work: 3 day(s) per week  Problems taking medications regularly No  Medication side effects: No  Have you had an eye exam in the past two years? yes  Do you see a dentist twice per year? no  Do you have sleep apnea, excessive snoring or daytime drowsiness?yes, does not use CPAP due to sinus issues          Today's PHQ-2 Score:       1/29/2024     2:58 PM 2/8/2023     1:57 PM   PHQ-2 ( 1999 Pf[employer])   Q1: Little interest or pleasure in doing things 0 0   Q2: Feeling down, depressed or hopeless 0 0   PHQ-2 Score 0 0       Abuse: Current or Past(Physical, Sexual or Emotional)- No  Do you feel safe in your environment? Yes        Social History     Tobacco Use    Smoking status: Never     Passive exposure: Never    Smokeless tobacco: Never   Substance Use Topics    Alcohol use: Not Currently     Comment: occasional     If you drink alcohol do you typically have >3 drinks per day or >7 drinks per week? No                      Last PSA:   [facility]ott PSA   Date Value Ref Range Status   02/27/2023 2.75 < OR = 4.00 ng/mL Final     Comment:     The total PSA value from this assay system is   standardized against the WHO standard. The test   result will be approximately 20% lower when compared   to the equimolar-standardized total PSA (Sandra   Ramses). Comparison of serial PSA results should be   interpreted with this fact in mind.     This test was performed using the Siemens   chemiluminescent method. Values obtained from   different assay methods cannot be used  interchangeably. PSA levels, regardless of  value, should not be interpreted as  absolute  evidence of the presence or absence of disease.         Reviewed orders with patient. Reviewed health maintenance and updated orders accordingly - Yes  BP Readings from Last 3 Encounters:   04/04/24 130/86   01/29/24 (!) 132/94   11/15/23 (!) 136/94    Wt Readings from Last 3 Encounters:   04/04/24 95.3 kg (210 lb)   01/29/24 98 kg (216 lb)   11/15/23 95.1 kg (209 lb 9.6 oz)                  Patient Active Problem List   Diagnosis    ACP (advance care planning)    Health Care Home    Hypertension    Persistent atrial fibrillation (H)    Hyperlipidemia     Past Surgical History:   Procedure Laterality Date    KNEE SURGERY Left        Social History     Tobacco Use    Smoking status: Never     Passive exposure: Never    Smokeless tobacco: Never   Substance Use Topics    Alcohol use: Not Currently     Comment: occasional     Family History   Problem Relation Age of Onset    Diabetes No family hx of     Cerebrovascular Disease No family hx of     Colon Cancer No family hx of     Coronary Artery Disease No family hx of          Current Outpatient Medications   Medication Sig Dispense Refill    atorvastatin (LIPITOR) 20 MG tablet Take 1 tablet (20 mg) by mouth daily 90 tablet 3    fluticasone (FLONASE) 50 MCG/ACT nasal spray Spray 1 spray into both nostrils daily       No Known Allergies  Recent Labs   Lab Test 02/27/23  0000      HDL 47   TRIG 108   CR 0.99   POTASSIUM 4.78        Reviewed and updated as needed this visit by clinical staff   Tobacco  Allergies  Meds  Problems             Reviewed and updated as needed this visit by Provider                  No past medical history on file.   Past Surgical History:   Procedure Laterality Date    KNEE SURGERY Left        ROS:  CONSTITUTIONAL: NEGATIVE for fever, chills, change in weight  INTEGUMENTARY/SKIN: NEGATIVE for worrisome rashes, moles or lesions  EYES: NEGATIVE for vision changes or irritation  ENT: NEGATIVE for ear, mouth and throat  "problems  RESP: NEGATIVE for significant cough or SOB  CV: NEGATIVE for chest pain, palpitations or peripheral edema  GI: NEGATIVE for nausea, abdominal pain, heartburn, or change in bowel habits   male: negative for dysuria, hematuria, decreased urinary stream, erectile dysfunction, urethral discharge  MUSCULOSKELETAL: NEGATIVE for significant arthralgias or myalgia  NEURO: NEGATIVE for weakness, dizziness or paresthesias  ENDOCRINE: NEGATIVE for temperature intolerance, skin/hair changes  PSYCHIATRIC: NEGATIVE for changes in mood or affect    OBJECTIVE:   /86 (BP Location: Right arm, Patient Position: Sitting, Cuff Size: Adult Large)   Pulse 62   Temp 97.5  F (36.4  C) (Temporal)   Ht 1.778 m (5' 10\")   Wt 95.3 kg (210 lb)   SpO2 98%   BMI 30.13 kg/m    EXAM:  GENERAL: alert and no distress  EYES: Eyes grossly normal to inspection, PERRL and conjunctivae and sclerae normal  HENT: ear canals and TM's normal, nose and mouth without ulcers or lesions  NECK: no adenopathy, no asymmetry, masses, or scars  RESP: lungs clear to auscultation - no rales, rhonchi or wheezes  CV: regular rate and rhythm, normal S1 S2, no S3 or S4, no murmur, click or rub, no peripheral edema  ABDOMEN: soft, nontender, no hepatosplenomegaly, no masses and bowel sounds normal   (male): normal male genitalia without lesions or urethral discharge, no hernia  MS: no gross musculoskeletal defects noted, no edema  SKIN: no suspicious lesions or rashes  NEURO: Normal strength and tone, mentation intact and speech normal  PSYCH: mentation appears normal, affect normal/bright    Diagnostic Test Results:  Labs reviewed in Epic    ASSESSMENT/PLAN:   (Z00.00) Routine general medical examination at a health care facility  Comment: discussed preventitive healthcare   Plan: Continue to work on healthy diet and exercise, discussed healthy habits     (E78.2) Mixed hyperlipidemia  Comment: control uncertain, has been slightly noncompliant with " "statin  Plan: continue current medications at current doses     (I48.20) Chronic atrial fibrillation (H)-low CHADvasc score  Comment: pt due to see cardiology in next few months, encouraged he schedule  Plan:     (Z12.5) Special screening for malignant neoplasm of prostate  Comment:   Plan:     (Z12.11) Special screening for malignant neoplasms, colon  Comment:   Plan:     Patient has been advised of split billing requirements and indicates understanding: Yes  COUNSELING:  Reviewed preventive health counseling, as reflected in patient instructions       Regular exercise       Healthy diet/nutrition       Immunizations  UTD         Colorectal cancer screening       Prostate cancer screening    Estimated body mass index is 30.13 kg/m  as calculated from the following:    Height as of this encounter: 1.778 m (5' 10\").    Weight as of this encounter: 95.3 kg (210 lb).    Weight management plan: Discussed healthy diet and exercise guidelines    He reports that he has never smoked. He has never been exposed to tobacco smoke. He has never used smokeless tobacco.      Counseling Resources:  ATP IV Guidelines  Pooled Cohorts Equation Calculator  FRAX Risk Assessment  ICSI Preventive Guidelines  Dietary Guidelines for Americans, 2010  USDA's MyPlate  ASA Prophylaxis  Lung CA Screening    Toni Mendoza MD  [facility]  "

## 2024-04-05 LAB — ABBOTT PSA - QUEST: 2.43 NG/ML

## 2024-06-17 PROBLEM — Z76.89 HEALTH CARE HOME: Status: RESOLVED | Noted: 2023-02-08 | Resolved: 2024-06-17

## 2024-06-28 ENCOUNTER — ALLIED HEALTH/NURSE VISIT (OUTPATIENT)
Dept: RESEARCH | Facility: CLINIC | Age: 64
End: 2024-06-28
Payer: COMMERCIAL

## 2024-06-28 VITALS
TEMPERATURE: 98 F | SYSTOLIC BLOOD PRESSURE: 151 MMHG | RESPIRATION RATE: 18 BRPM | HEIGHT: 70 IN | WEIGHT: 215 LBS | OXYGEN SATURATION: 98 % | HEART RATE: 63 BPM | BODY MASS INDEX: 30.78 KG/M2 | DIASTOLIC BLOOD PRESSURE: 100 MMHG

## 2024-06-28 DIAGNOSIS — Z00.6 EXAMINATION OF PARTICIPANT OR CONTROL IN CLINICAL RESEARCH: Primary | ICD-10-CM

## 2024-06-28 PROCEDURE — 99207 PR NO CHARGE-RESEARCH SERVICE: CPT

## 2024-06-28 PROCEDURE — 93005 ELECTROCARDIOGRAM TRACING: CPT

## 2024-06-28 PROCEDURE — 93000 ELECTROCARDIOGRAM COMPLETE: CPT | Mod: RTG | Performed by: INTERNAL MEDICINE

## 2024-06-28 PROCEDURE — 93000 ELECTROCARDIOGRAM COMPLETE: CPT | Mod: RTG | Performed by: STUDENT IN AN ORGANIZED HEALTH CARE EDUCATION/TRAINING PROGRAM

## 2024-06-28 NOTE — PROGRESS NOTES
Whoop Study Consent    Study Description: The objective of this study is to evaluate the safety and performance of the WHOOP ECG feature in adults (22 years or older) with normal sinus rhythm or diagnosed with AF.      CONSENT     Paul Valverde a 63 year old male, was on-site today to discuss participation in the Whoop Study.       The consent form was reviewed with the patient.     The review of the study included:  Study Purpose    Participant Responsibilities    Study Data and Devices    Benefits and Risks of Participation    Compensation and Costs of Participation    Voluntary Participation    Confidentiality    Injury and Legal Rights      Protocol Version: 3.0 (Version Date 30-Apr-2024)    Screening Number: SCR - [id number]    The subject was queried in regards to his willingness to continue and his questions were answered to his satisfaction.   The patient has given his agreement to volunteer and participate in the above noted study.   The eConsent and HIPAA form version 2.0 (Version Date 12-Apr-2024) was signed  on  28-JUN-2024 with the Clinical Research Unit of [facility].     A copy of the consent will be placed in subject's medical record. A copy of the consent form was given to the subject today.    Study data is directly entered into [employer] and [employer] per protocol.     No study procedures were done prior to Paul Valverde providing informed consent.     Has this subject had a previous screen failure in this study? No    If yes, previous Subject Number: NA     Study Phase: Phase 2    28-JUN-2024    Lela Blanco RN

## 2024-06-28 NOTE — PROGRESS NOTES
Study Description: The objective of this study is to evaluate the safety and performance of the WHOOP ECG feature in adults (22 years or older) with normal sinus rhythm or diagnosed with AF.    Post Exercise Assessment: Paul Valverde  denies symptoms post exercise a 12 lead ECG was completed and reviewed.     Electrocardiogram has returned to baseline and Paul Valverde was released from the clinical research unit and completed the study.     28 -JUN- 2024    Sharon Jensen NP

## 2024-06-28 NOTE — PROGRESS NOTES
[facility] Inclusion/Exclusion Criteria:    Study Name: [facility]   : Ari Philippe MD      Study Description: The ShareHows ECG feature is a software-only mobile medical application intended for use with the Whoop strap to create, record, store, transfer and display a single-channel electrocardiogram (ECG) qualitatively similar to a lead I ECG.     Protocol Version: 3.0 (30-Apr-2024)  Consent Version: 2.0 ( 12-Apr-2024 )    Criteria #  Inclusion Criteria (ALL MUST BE YES)  YES/NO/N/A   1  Aged 22 years or older  Yes   2 Ability to provide informed consent Yes   3 Willing to participate and to follow the procedures per the Principle Investigator's instructions Yes   4  Resides in the [place]    Yes   5   Wrist circumference: 130 mm to 245 mm at band wear position Yes   6    Previous medical history of persistent, permanent, or chronic AF and being in AF at the time of enrollment (AF Cohort Only)    Yes   7     No known history of AF and being in normal sinus rhythm at the time of enrollment (SR Cohort Only) Yes             Criteria # Exclusion Criteria (ALL MUST BE NO) YES/NO/N/A   1  Subjects with an implantable pacemaker device or implantable cardioverter-defibrillator device No   2 Medical history of a life-threatening cardiac arrhythmia eg., Ventricular tachycardia or fibrillation No   3  Any known allergies to medical adhesives, isopropyl alcohol, or ECG patch    No   4  Any known allergy or or sensitivity to thermoplastics, metals with PVD coatings or Elastane used in the wrist fitness device  No   5  Clinically significant body tremors that compromise study measurements    No   6  Pregnant at the time of enrollment   NA   7  Any physical disability that prevents safe and adequate testing    No   8  Physical or medical impairments that preclude exercise testing, including, but not limited to, back pain and leg claudication No   9  Mental impairment as determined by the Investigator or  designee    No   10  Subjects with any medical history, physical examination finding, vital sign or other finding or assessment that could compromise subject safety, study integrity or accurate that could compromise subject safety, study integrity or accurate assessment of study objectives. This includes, but is not limited to, known untreated medical conditions that may be considered clinically significant, such as significant anemia, electrolyte imbalance, untreated or uncontrolled thyroid disease, and open wound(s) in the areas of test band positioning   No   11    Vital sign measurements, medical history of physical examination finding that makes the subject inappropriate for participation according to the investigator * No   12    Scarring, tattoos, large, pigmented moles or other skin pathology in the area of sensor location  No   13    Severe skin conditions on either wrist, that would preclude wearing the sensor. Severe symptomatic skin injury, disorder, allergy or disease such as eczema, rosacea, impetigo, dermatomyositis, or contact dermatitis on wrists or other areas where sensors or electrodes will be placed  No   14    Clinically significant hand tremors as judged by the Investigator No   15  Participated in Phase 1 of the study (Only for Phase 2 cohort)  No   * If subject is a screen fail due to PE findings, choose whichever exclusion criteria matches that finding in addition to E10.     Patient does fulfill study inclusion criteria and no exclusion criteria are found.     Ari Philippe MD    28-JUN-2024    Lela Blanco RN

## 2024-06-28 NOTE — PROGRESS NOTES
"     Whoop Study    Study Description: The Advise Onlyop ECG feature is a software-only mobile medical application intended for use with the Whoop strap to create, record, store, transfer and display a single-channel electrocardiogram (ECG) qualitatively similar to a lead I ECG.    SCREENING       Demographic Info  Paul Valverde   1960          63 year old  male    Woman of Child Bearing Potential?  N/A (Male)   If no, Why? N/A    Race: White/  Ethnicity: Not  or      Time Seated/Time of Assessment: 13:10:00 HH:MM:SS    Heart Abnormalities:  History of Heart Rhythm Abnormalities? Yes Specify Below  AF long-standing (>12 months duration) Onset Year: 2021     No past medical history on file.  Sleep Apnea 23JUN2021, Ongoing  Atrial Fibrillation Long Standing 23JUN2021, ONgoing  Hyperlipidemia 23JUN2021,Ongoing    No Known Allergies       Current Outpatient Medications:     atorvastatin (LIPITOR) 20 MG tablet, Take 1 tablet (20 mg) by mouth daily, Disp: 90 tablet, Rfl: 3      Medication Name (Generic) Indication Start Date Ongoing? Dose Unit Frequency Route   Atorvastatin  Hyperlipidemia 23JUN2021 Yes 20  mg QD Oral     Exercise: Frequently (a few times a week)  Consume Caffeine:Daily - Heavy (more than 1 serving a day)  Use Tobacco/Nicotine: Never  Consume Alcohol: Occasionally (a few times a month)  Use Recreational Drugs: Never    Dominant Hand: Right  Preferred Band Hand: Left  Reason for Choosing Band Hand: Subject preference    Skin Fold Thickness: 4 mm  Band Wrist Circumference: 170  mm  Wrist Hairiness: Fine, high density    Does the subject have tattoos, moles, or scars on band wrist? No  Blackwood Skin Tone: Blackwood: 1    Vitals Assessment Time 13:25:00  BP (!) 151/100   Pulse 63   Temp 98  F (36.7  C)   Resp 18   Ht 1.778 m (5' 10\")   Wt 97.5 kg (215 lb)   SpO2 98%   BMI 30.85 kg/m      Wait 1 minute between repeat measurements   Heart Rate (bpm) SpO2 (%) Blood Pressure     " Assessment Time Result Result  Result    2nd Measurement 13:27:00 62 98 165/106   3rd Measurement 13:53:00 63 98 145/90       Please see Screening ECG for providers rhythm interpretation.    ECG Rhythm Strip Time 13:17:32    Physical Exam Time of Assessment is equivalent to ECG time as ENRRIQUE is present for the ECG recording and continues their assessment thereafter.     ENROLLMENT     Subject has met all requirements and is Enrolled in the Study?: Yes    Enrollment Number: [id number]   [facility] User ID: [id number]     DATA COLLECTION     Device Information    Study mobile device and laptop synchronized? Yes    Comparative (Holter) device recorder date and time set according to local clock? Yes    [employer] Authoria Kit 11: Strap SN: [id number] Study Phone: [phone]     Was the QvolveOP Strap Applied? Yes  Time of Application: 14:08:00     Subject reviewed [facility] device instructions for use? Yes    Comparative (Holter) ECG device applied? Yes  Time of  Holter Application: 14:20:35    Comparative Holter Kit IDs (insert name Sulma)  Study Laptop Device  [id number]  Holter Device S/N  M12R - [id number]     Lead Placement QC Performed by:  Hong MANZO    Practice Strap ECG Taken? Yes  Number of Practice Trials: 1     Data recording for ECG-Resting and ECG-Exercise sections- were directly entered into [facility] during study visit.       END OF STUDY     Was the wrist device removed after all the trials? Yes  Was the 12 lead ECG Holter detached after all the trials? Yes    Any Adverse Events? No  Any Protocol Deviations? No    Any changes in medication since screening visit? No  Any device deficiencies? No If yes complete a device deficiency note    Is all study data for this visit collected? Yes      Date Subject Exited the Study: 28-JUN-2024  Time Subject Exited the Study: 15:10:00     Did the subject successfully complete the study? Yes   If no, Why? N/A        28-JUN-2024    Lela Blanco RN

## 2024-06-28 NOTE — PROGRESS NOTES
"    Study Physical Exam      Medical History Reviewed? Yes    Physical Examination  For abnormal findings, please evaluate if the finding is Clinically Significant (by 'CS') or Not Clinically Significant (by 'NCS')  General Appearance   Normal  Head and Neck   Normal  Eyes     Normal  Ears, Nose Mouth and Throat  Normal  Cardiovascular   Abnormal; NCS due to history of atrial fibrillation   Respiratory    Normal  Skin and Hair of Wrists  Normal      Physical disability that presents safe or adequate testing: Not present  Dermatological conditions that preclude wearing of sensor or satisfactory data acquisition: Not present  Tremor: Not present  Other: Normal    Vitals:    06/28/24 1325   BP: (!) 151/100   Pulse: 63   Resp: 18   Temp: 98  F (36.7  C)   SpO2: 98%   Weight: 97.5 kg (215 lb)   Height: 1.778 m (5' 10\")            Second BP was 165/106 and third BP  was 145/90     Electrocardiogram Interpretation:   12 Lead Interpretation: Atrial Fibrillation     28-JUN-2024    Sharon Jensen NP      "

## 2024-07-01 LAB
ATRIAL RATE - MUSE: 104 BPM
DIASTOLIC BLOOD PRESSURE - MUSE: NORMAL MMHG
INTERPRETATION ECG - MUSE: NORMAL
P AXIS - MUSE: NORMAL DEGREES
PR INTERVAL - MUSE: NORMAL MS
QRS DURATION - MUSE: 86 MS
QT - MUSE: 382 MS
QTC - MUSE: 418 MS
R AXIS - MUSE: -20 DEGREES
SYSTOLIC BLOOD PRESSURE - MUSE: NORMAL MMHG
T AXIS - MUSE: 19 DEGREES
VENTRICULAR RATE- MUSE: 72 BPM

## 2024-07-01 PROCEDURE — 93010 ELECTROCARDIOGRAM REPORT: CPT | Performed by: INTERNAL MEDICINE

## 2024-07-02 LAB
ATRIAL RATE - MUSE: 258 BPM
DIASTOLIC BLOOD PRESSURE - MUSE: NORMAL MMHG
INTERPRETATION ECG - MUSE: NORMAL
P AXIS - MUSE: NORMAL DEGREES
PR INTERVAL - MUSE: NORMAL MS
QRS DURATION - MUSE: 84 MS
QT - MUSE: 358 MS
QTC - MUSE: 418 MS
R AXIS - MUSE: -3 DEGREES
SYSTOLIC BLOOD PRESSURE - MUSE: NORMAL MMHG
T AXIS - MUSE: 56 DEGREES
VENTRICULAR RATE- MUSE: 82 BPM

## 2024-07-02 PROCEDURE — 93010 ELECTROCARDIOGRAM REPORT: CPT | Performed by: STUDENT IN AN ORGANIZED HEALTH CARE EDUCATION/TRAINING PROGRAM

## 2024-08-05 ENCOUNTER — OFFICE VISIT (OUTPATIENT)
Dept: FAMILY MEDICINE | Facility: CLINIC | Age: 64
End: 2024-08-05

## 2024-08-05 VITALS
OXYGEN SATURATION: 97 % | BODY MASS INDEX: 31.67 KG/M2 | DIASTOLIC BLOOD PRESSURE: 84 MMHG | SYSTOLIC BLOOD PRESSURE: 136 MMHG | TEMPERATURE: 97.9 F | HEIGHT: 70 IN | WEIGHT: 221.2 LBS | HEART RATE: 61 BPM

## 2024-08-05 DIAGNOSIS — J01.00 ACUTE MAXILLARY SINUSITIS, RECURRENCE NOT SPECIFIED: Primary | ICD-10-CM

## 2024-08-05 PROCEDURE — 99213 OFFICE O/P EST LOW 20 MIN: CPT | Performed by: PHYSICIAN ASSISTANT

## 2024-08-05 RX ORDER — AZITHROMYCIN 250 MG/1
TABLET, FILM COATED ORAL
Qty: 6 TABLET | Refills: 0 | Status: SHIPPED | OUTPATIENT
Start: 2024-08-05 | End: 2024-08-10

## 2024-08-05 NOTE — PROGRESS NOTES
"  Assessment & Plan     Acute maxillary sinusitis, recurrence not specified - symptoms and physical exam consistent with diagnosis, will plan to treat as below per pt's preference  - azithromycin (ZITHROMAX) 250 MG tablet  Dispense: 6 tablet; Refill: 0  - discussed alternative antibiotic treatments and informed pt about possible resistance to azithromycin and needing a different antibiotic in the future-pt declines doxycycline today  Nasal rinses PRN, ibuprofen/Tylenol PRN  - call if symptoms persist after treatment    Follow-up as needed.    No follow-ups on file.    Subjective   Paul is a 63 year old, presenting for the following health issues:  Sinus Problem (Pt is here for a sinus infection for about 12 days facial pressure green phlegm running nose and drainage triggers coughing /Pt refused Covid test took one this am NEG)    HPI     Pt presents with sinus concerns. Has had maxillary and frontal pain/pressure, nasal congestion, green rhinorrhea, postnasal drainage, cough and fatigue for ~12 days. States it feels like his previous sinus infections. Infections typically resolve with Zpak. Notes the Augmentin prescribed for the last episode did not work as well, but it resolved the infection. Uses Flonase every day prn and saline nasal irrigations prn. Denies fever, otalgia, and recent antibiotic use.     Review of Systems  Constitutional, neuro, ENT, endocrine, pulmonary, cardiac, gastrointestinal, genitourinary, musculoskeletal, integument and psychiatric systems are negative, except as otherwise noted.      Objective    /84 (BP Location: Right arm, Patient Position: Sitting, Cuff Size: Adult Large)   Pulse 61   Temp 97.9  F (36.6  C) (Oral)   Ht 1.765 m (5' 9.5\")   Wt 100.3 kg (221 lb 3.2 oz)   SpO2 97%   BMI 32.20 kg/m    Body mass index is 32.2 kg/m .  Physical Exam   GENERAL: alert and no distress  HENT: normal cephalic/atraumatic, right ear: occluded with wax, nose and mouth without ulcers or " lesions, nasal mucosa with moderate edema and erythema, maxillary and frontal sinuses with tenderness to palpation bilaterally, oropharynx with mild erythema, oral mucous membranes moist  NECK: no adenopathy, no asymmetry, masses, or scars  RESP: lungs clear to auscultation - no rales, rhonchi or wheezes  CV: irregularly irregular rhythm, no murmur, click or rub, peripheral pulses strong, and no peripheral edema  ABDOMEN: soft, nontender, no hepatosplenomegaly, no masses and bowel sounds normal  MS: no gross musculoskeletal defects noted, no edema  NEURO: Normal strength and tone, mentation intact and speech normal  PSYCH: mentation appears normal, affect normal/bright          Signed Electronically by: Abdullahi Chou PA-C

## 2024-08-05 NOTE — NURSING NOTE
Chief Complaint   Patient presents with    Sinus Problem     Pt is here for a sinus infection for about 12 days facial pressure green phlegm running nose and drainage triggers coughing   Pt refused Covid test took one this am NEG     Pre-visit Screening:  Immunizations:  up to date  Colonoscopy:  is up to date done in July 2024  Mammogram: na  Asthma Action Test/Plan:  na  PHQ9:  na  GAD7:  na  Questioned patient about current smoking habits Pt. has never smoked.  Ok to leave detailed message on voice mail for today's visit only yes, phone # [phone] (home) [phone] (work)

## 2024-11-01 ENCOUNTER — OFFICE VISIT (OUTPATIENT)
Dept: FAMILY MEDICINE | Facility: CLINIC | Age: 64
End: 2024-11-01

## 2024-11-01 VITALS
TEMPERATURE: 97 F | SYSTOLIC BLOOD PRESSURE: 132 MMHG | HEART RATE: 75 BPM | DIASTOLIC BLOOD PRESSURE: 80 MMHG | BODY MASS INDEX: 32.23 KG/M2 | WEIGHT: 221.4 LBS | OXYGEN SATURATION: 99 %

## 2024-11-01 DIAGNOSIS — J32.9 RECURRENT SINUS INFECTIONS: Primary | ICD-10-CM

## 2024-11-01 PROCEDURE — 99213 OFFICE O/P EST LOW 20 MIN: CPT | Performed by: PHYSICIAN ASSISTANT

## 2024-11-01 NOTE — NURSING NOTE
Chief Complaint   Patient presents with    Consult     Sinus infection started 10/17 with bad cold. Wants medication.     Pre-visit Screening:  Immunizations:    Colonoscopy:    Mammogram:   Asthma Action Test/Plan:    PHQ9:    GAD7:    Questioned patient about current smoking habits Pt. never.  Ok to leave detailed message on voice mail for today's visit only yes, phone # [phone] (home) [phone] (work)

## 2024-11-01 NOTE — PROGRESS NOTES
Assessment & Plan     Recurrent sinus infections-recurrent sinus infection, symptoms c/w diagnosis. Will treat with Augmentin and await improvement  -Rest, increase fluids, honey for cough suppression/sore throat  -Add Mucinex and Sudafed D for symptomatic relief  -Ibuprofen/Tylenol as needed for symptomatic relief  Seek emergency care for significant shortness of breath, chest pain, and/or fever >103F that cannot be controlled with antipyretics     - amoxicillin-clavulanate (AUGMENTIN) 875-125 MG tablet  Dispense: 14 tablet; Refill: 0  -Pt will call if needing 10 day course-willing to extend if necessary if symptoms ongoing after 7 days          Follow up as needed    No follow-ups on file.    Subjective   Paul is a 63 year old, presenting for the following health issues:  Consult (Sinus infection started 10/17 with bad cold. Wants medication.)    HPI     Pt presents with sinus pressure/pain/nasal congestion for last 2 weeks. Had a URI to start, then turned into sinus pain/pressure. Also dealing with cough-drainage with this. Last sinus infection 8/24. Feels like his last sinus infection (last had Z-pack).     Review of Systems  Constitutional, neuro, ENT, endocrine, pulmonary, cardiac, gastrointestinal, genitourinary, musculoskeletal, integument and psychiatric systems are negative, except as otherwise noted.      Objective    /80 (BP Location: Left arm, Patient Position: Sitting, Cuff Size: Adult Large)   Pulse 75   Temp 97  F (36.1  C) (Temporal)   Wt 100.4 kg (221 lb 6.4 oz)   SpO2 99%   BMI 32.23 kg/m    Body mass index is 32.23 kg/m .  Physical Exam   GENERAL: alert and no distress  HENT: normal cephalic/atraumatic, ear canals and TM's normal, nose and mouth without ulcers or lesions, oropharynx clear, oral mucous membranes moist, and sinuses: maxillary, frontal tenderness on both sides  NECK: no adenopathy, no asymmetry, masses, or scars  RESP: lungs clear to auscultation - no rales, rhonchi or  wheezes  CV: regular rate and rhythm, normal S1 S2, no S3 or S4, no murmur, click or rub, no peripheral edema  ABDOMEN: soft, nontender, no hepatosplenomegaly, no masses and bowel sounds normal  MS: no gross musculoskeletal defects noted, no edema  PSYCH: mentation appears normal, affect normal/bright            Signed Electronically by: Abdullahi Chou PA-C

## 2024-11-08 ENCOUNTER — TELEPHONE (OUTPATIENT)
Dept: FAMILY MEDICINE | Facility: CLINIC | Age: 64
End: 2024-11-08

## 2024-11-08 DIAGNOSIS — J32.9 RECURRENT SINUS INFECTIONS: Primary | ICD-10-CM

## 2024-11-08 NOTE — TELEPHONE ENCOUNTER
Pt called asking for an extension of Augmentin. He stated his sinus infection is getting better, but he feels he needs a few extra days to help clear it up.    Please advise # [phone]    Thanks, Bernie LECHUGA

## 2025-05-10 ENCOUNTER — HEALTH MAINTENANCE LETTER (OUTPATIENT)
Age: 65
End: 2025-05-10

## 2025-06-23 DIAGNOSIS — E78.2 MIXED HYPERLIPIDEMIA: ICD-10-CM

## 2025-06-24 RX ORDER — ATORVASTATIN CALCIUM 20 MG/1
20 TABLET, FILM COATED ORAL DAILY
COMMUNITY
Start: 2025-06-24

## 2025-06-30 ENCOUNTER — OFFICE VISIT (OUTPATIENT)
Dept: FAMILY MEDICINE | Facility: CLINIC | Age: 65
End: 2025-06-30

## 2025-06-30 VITALS
BODY MASS INDEX: 30.38 KG/M2 | SYSTOLIC BLOOD PRESSURE: 126 MMHG | HEIGHT: 70 IN | HEART RATE: 91 BPM | OXYGEN SATURATION: 97 % | TEMPERATURE: 97.7 F | WEIGHT: 212.2 LBS | DIASTOLIC BLOOD PRESSURE: 88 MMHG

## 2025-06-30 DIAGNOSIS — Z12.5 SPECIAL SCREENING FOR MALIGNANT NEOPLASM OF PROSTATE: ICD-10-CM

## 2025-06-30 DIAGNOSIS — I48.20 CHRONIC ATRIAL FIBRILLATION (H): ICD-10-CM

## 2025-06-30 DIAGNOSIS — E78.2 MIXED HYPERLIPIDEMIA: ICD-10-CM

## 2025-06-30 DIAGNOSIS — Z00.00 ROUTINE GENERAL MEDICAL EXAMINATION AT A HEALTH CARE FACILITY: Primary | ICD-10-CM

## 2025-06-30 LAB
ALBUMIN SERPL-MCNC: 4.5 G/DL (ref 3.6–5.1)
ALP SERPL-CCNC: 50 U/L (ref 33–130)
ALT 1742-6: 15 U/L (ref 0–32)
AST 1920-8: 17 U/L (ref 0–35)
BILIRUB SERPL-MCNC: 1.7 MG/DL (ref 0.2–1.2)
BUN SERPL-MCNC: 16 MG/DL (ref 7–25)
BUN/CREATININE RATIO: 14 (ref 6–32)
CALCIUM SERPL-MCNC: 9.5 MG/DL (ref 8.6–10.3)
CHLORIDE SERPLBLD-SCNC: 104.1 MMOL/L (ref 98–110)
CHOLEST SERPL-MCNC: 156 MG/DL (ref 0–199)
CHOLEST/HDLC SERPL: 3 {RATIO} (ref 0–5)
CO2 SERPL-SCNC: 27.5 MMOL/L (ref 20–32)
CREAT SERPL-MCNC: 1.14 MG/DL (ref 0.6–1.3)
GLUCOSE SERPL-MCNC: 104 MG/DL (ref 60–99)
HDLC SERPL-MCNC: 45 MG/DL (ref 40–150)
LDLC SERPL CALC-MCNC: 93 MG/DL (ref 0–129)
POTASSIUM SERPL-SCNC: 4.82 MMOL/L (ref 3.5–5.3)
PROT SERPL-MCNC: 7 G/DL (ref 6.1–8.1)
SODIUM SERPL-SCNC: 138.8 MMOL/L (ref 135–146)
TRIGL SERPL-MCNC: 92 MG/DL (ref 0–149)

## 2025-06-30 PROCEDURE — 80061 LIPID PANEL: CPT | Performed by: FAMILY MEDICINE

## 2025-06-30 PROCEDURE — 84153 ASSAY OF PSA TOTAL: CPT | Performed by: FAMILY MEDICINE

## 2025-06-30 PROCEDURE — 36415 COLL VENOUS BLD VENIPUNCTURE: CPT | Performed by: FAMILY MEDICINE

## 2025-06-30 PROCEDURE — 99396 PREV VISIT EST AGE 40-64: CPT | Performed by: FAMILY MEDICINE

## 2025-06-30 PROCEDURE — 80053 COMPREHEN METABOLIC PANEL: CPT | Performed by: FAMILY MEDICINE

## 2025-06-30 RX ORDER — ATORVASTATIN CALCIUM 20 MG/1
20 TABLET, FILM COATED ORAL DAILY
Qty: 90 TABLET | Refills: 3 | Status: SHIPPED | OUTPATIENT
Start: 2025-06-30

## 2025-06-30 NOTE — PROGRESS NOTES
3  SUBJECTIVE:   CC: Paul Valverde is an 64 year old male who presents for preventive health visit.       Patient has been advised of split billing requirements and indicates understanding: Yes  Healthy Habits:  Do you get at least three servings of calcium containing foods daily (dairy, green leafy vegetables, etc.)? yes  Amount of exercise or daily activities, outside of work: multiple day(s) per week  Problems taking medications regularly No  Medication side effects: No  Have you had an eye exam in the past two years? yes  Do you see a dentist twice per year? no  Do you have sleep apnea, excessive snoring or daytime drowsiness?no          Today's PHQ-2 Score:       6/30/2025    10:16 AM 1/29/2024     2:58 PM   PHQ-2 ( 1999 [employer])   Q1: Little interest or pleasure in doing things 0 0   Q2: Feeling down, depressed or hopeless 0 0   PHQ-2 Score 0 0       Abuse: Current or Past(Physical, Sexual or Emotional)- No  Do you feel safe in your environment? Yes    Have you ever done Advance Care Planning? (For example, a Health Directive, POLST, or a discussion with a medical provider or your loved ones about your wishes): Yes, patient states has an Advance Care Planning document and will bring a copy to the clinic.    Social History     Tobacco Use    Smoking status: Never     Passive exposure: Never    Smokeless tobacco: Never   Substance Use Topics    Alcohol use: Not Currently     Comment: occasional     If you drink alcohol do you typically have >3 drinks per day or >7 drinks per week? No                      Last PSA:   [facility] PSA   Date Value Ref Range Status   04/04/2024 2.43 < OR = 4.00 ng/mL Final     Comment:     The total PSA value from this assay system is   standardized against the WHO standard. The test   result will be approximately 20% lower when compared   to the equimolar-standardized total PSA (Sandra   Minerva). Comparison of serial PSA results should be   interpreted with this fact in mind.     This  test was performed using the Siemens   chemiluminescent method. Values obtained from   different assay methods cannot be used  interchangeably. PSA levels, regardless of  value, should not be interpreted as absolute  evidence of the presence or absence of disease.         Reviewed orders with patient. Reviewed health maintenance and updated orders accordingly - Yes  BP Readings from Last 3 Encounters:   06/30/25 126/88   11/01/24 132/80   08/05/24 136/84    Wt Readings from Last 3 Encounters:   06/30/25 96.3 kg (212 lb 3.2 oz)   11/01/24 100.4 kg (221 lb 6.4 oz)   08/05/24 100.3 kg (221 lb 3.2 oz)                  Patient Active Problem List   Diagnosis    ACP (advance care planning)    Hypertension    Persistent atrial fibrillation (H)    Hyperlipidemia     Past Surgical History:   Procedure Laterality Date    KNEE SURGERY Left        Social History     Tobacco Use    Smoking status: Never     Passive exposure: Never    Smokeless tobacco: Never   Substance Use Topics    Alcohol use: Not Currently     Comment: occasional     Family History   Problem Relation Age of Onset    Diabetes No family hx of     Cerebrovascular Disease No family hx of     Colon Cancer No family hx of     Coronary Artery Disease No family hx of          Current Outpatient Medications   Medication Sig Dispense Refill    atorvastatin (LIPITOR) 20 MG tablet Take 1 tablet (20 mg) by mouth daily 90 tablet 3    fluticasone (FLONASE) 50 MCG/ACT nasal spray Spray 1 spray into both nostrils daily      amoxicillin-clavulanate (AUGMENTIN) 875-125 MG tablet Take 1 tablet by mouth 2 times daily. (Patient not taking: Reported on 6/30/2025) 6 tablet 0    amoxicillin-clavulanate (AUGMENTIN) 875-125 MG tablet Take 1 tablet by mouth 2 times daily. (Patient not taking: Reported on 6/30/2025) 14 tablet 0     No Known Allergies  Recent Labs   Lab Test 04/04/24  0000 02/27/23  0000   LDL 95 103   HDL 44 47   TRIG 106 108   CR 1.03 0.99   POTASSIUM 4.74 4.78     "    Reviewed and updated as needed this visit by clinical staff   Tobacco  Allergies  Meds              Reviewed and updated as needed this visit by Provider                  No past medical history on file.   Past Surgical History:   Procedure Laterality Date    KNEE SURGERY Left        ROS:  CONSTITUTIONAL: NEGATIVE for fever, chills, change in weight  INTEGUMENTARY/SKIN: NEGATIVE for worrisome rashes, moles or lesions  EYES: NEGATIVE for vision changes or irritation  ENT: NEGATIVE for ear, mouth and throat problems  RESP: NEGATIVE for significant cough or SOB  CV: NEGATIVE for chest pain, palpitations or peripheral edema  GI: NEGATIVE for nausea, abdominal pain, heartburn, or change in bowel habits   male: negative for dysuria, hematuria, decreased urinary stream, erectile dysfunction, urethral discharge  MUSCULOSKELETAL: NEGATIVE for significant arthralgias or myalgia  NEURO: NEGATIVE for weakness, dizziness or paresthesias  PSYCHIATRIC: NEGATIVE for changes in mood or affect    OBJECTIVE:   /88 (BP Location: Left arm, Patient Position: Sitting, Cuff Size: Adult Large)   Pulse 91   Temp 97.7  F (36.5  C) (Temporal)   Ht 1.765 m (5' 9.5\")   Wt 96.3 kg (212 lb 3.2 oz)   SpO2 97%   BMI 30.89 kg/m    EXAM:  GENERAL: alert and no distress  EYES: Eyes grossly normal to inspection, PERRL and conjunctivae and sclerae normal  HENT: ear canals and TM's normal, nose and mouth without ulcers or lesions  NECK: no adenopathy, no asymmetry, masses, or scars  RESP: lungs clear to auscultation - no rales, rhonchi or wheezes  CV: irreg irreg, normal S1 S2, no S3 or S4, no murmur, click or rub, no peripheral edema  ABDOMEN: soft, nontender, no hepatosplenomegaly, no masses and bowel sounds normal   (male): normal male genitalia without lesions or urethral discharge, no hernia  MS: no gross musculoskeletal defects noted, no edema  SKIN: no suspicious lesions or rashes  NEURO: Normal strength and tone, mentation " "intact and speech normal  PSYCH: mentation appears normal, affect normal/bright    Diagnostic Test Results:  Labs reviewed in Epic    ASSESSMENT/PLAN:   (Z00.00) Routine general medical examination at a health care facility  (primary encounter diagnosis)  Comment: discussed preventitive healthcare   Plan: Continue to work on healthy diet and exercise, discussed healthy habits     (E78.2) Mixed hyperlipidemia  Comment: control uncertain  Plan: Lipid Panel ([facility]), Comprehensive Metobolic         Panel ([facility]), VENOUS COLLECTION        continue current medications at current doses pending labs    (I48.20) Chronic atrial fibrillation (H)-low CHADvasc score  Comment: stable-not on blood thinners  Plan:     (Z12.5) Special screening for malignant neoplasm of prostate  Comment:   Plan: PSA Total ([facility]), VENOUS COLLECTION            Patient has been advised of split billing requirements and indicates understanding: Yes  COUNSELING:  Reviewed preventive health counseling, as reflected in patient instructions       Regular exercise       Healthy diet/nutrition       Vision screening       Hearing screening       Colorectal cancer screening       Consider prostate cancer screening (age 55-69)    Estimated body mass index is 30.89 kg/m  as calculated from the following:    Height as of this encounter: 1.765 m (5' 9.5\").    Weight as of this encounter: 96.3 kg (212 lb 3.2 oz).    Weight management plan: Discussed healthy diet and exercise guidelines    He reports that he has never smoked. He has never been exposed to tobacco smoke. He has never used smokeless tobacco.      Counseling Resources:  ATP IV Guidelines  Pooled Cohorts Equation Calculator  FRAX Risk Assessment  ICSI Preventive Guidelines  Dietary Guidelines for Americans, 2010  USDA's MyPlate  ASA Prophylaxis  Lung CA Screening    Toni Mendoza MD  [facility]  "

## 2025-06-30 NOTE — NURSING NOTE
Chief Complaint   Patient presents with    Physical     Pt physical, is fasting today.     Pre-visit Screening:  Immunizations:  not up to date - Covid  Colonoscopy:  is up to date  Mammogram: Na  Asthma Action Test/Plan:  NA  PHQ9:  NA  GAD7:  NA  Questioned patient about current smoking habits Pt. has never smoked.  Ok to leave detailed message on voice mail for today's visit only Yes, phone # [phone] (home) [phone] (work)

## 2025-07-01 ENCOUNTER — RESULTS FOLLOW-UP (OUTPATIENT)
Dept: FAMILY MEDICINE | Facility: CLINIC | Age: 65
End: 2025-07-01

## 2025-07-01 LAB — ABBOTT PSA - QUEST: 2.1 NG/ML
